# Patient Record
Sex: MALE | Race: WHITE | Employment: FULL TIME | URBAN - METROPOLITAN AREA
[De-identification: names, ages, dates, MRNs, and addresses within clinical notes are randomized per-mention and may not be internally consistent; named-entity substitution may affect disease eponyms.]

---

## 2019-11-06 ENCOUNTER — OFFICE VISIT (OUTPATIENT)
Dept: FAMILY MEDICINE CLINIC | Age: 44
End: 2019-11-06
Payer: COMMERCIAL

## 2019-11-06 VITALS
SYSTOLIC BLOOD PRESSURE: 130 MMHG | OXYGEN SATURATION: 96 % | WEIGHT: 207 LBS | HEIGHT: 72 IN | DIASTOLIC BLOOD PRESSURE: 88 MMHG | BODY MASS INDEX: 28.04 KG/M2 | HEART RATE: 85 BPM

## 2019-11-06 DIAGNOSIS — Z13.1 SCREENING FOR DIABETES MELLITUS: ICD-10-CM

## 2019-11-06 DIAGNOSIS — Z00.00 ANNUAL PHYSICAL EXAM: ICD-10-CM

## 2019-11-06 DIAGNOSIS — G47.9 SLEEP DISTURBANCE: ICD-10-CM

## 2019-11-06 DIAGNOSIS — M79.606 MUSCULOSKELETAL PAIN OF LOWER EXTREMITY, UNSPECIFIED LATERALITY: Primary | ICD-10-CM

## 2019-11-06 LAB
A/G RATIO: 3.5 (ref 1.1–2.2)
ALBUMIN SERPL-MCNC: 5.6 G/DL (ref 3.4–5)
ALP BLD-CCNC: 48 U/L (ref 40–129)
ALT SERPL-CCNC: 30 U/L (ref 10–40)
ANION GAP SERPL CALCULATED.3IONS-SCNC: 15 MMOL/L (ref 3–16)
AST SERPL-CCNC: 18 U/L (ref 15–37)
BILIRUB SERPL-MCNC: 0.4 MG/DL (ref 0–1)
BUN BLDV-MCNC: 15 MG/DL (ref 7–20)
CALCIUM SERPL-MCNC: 9.9 MG/DL (ref 8.3–10.6)
CHLORIDE BLD-SCNC: 100 MMOL/L (ref 99–110)
CHOLESTEROL, TOTAL: 235 MG/DL (ref 0–199)
CO2: 26 MMOL/L (ref 21–32)
CREAT SERPL-MCNC: 0.9 MG/DL (ref 0.9–1.3)
GFR AFRICAN AMERICAN: >60
GFR NON-AFRICAN AMERICAN: >60
GLOBULIN: 1.6 G/DL
GLUCOSE BLD-MCNC: 87 MG/DL (ref 70–99)
HCT VFR BLD CALC: 46.7 % (ref 40.5–52.5)
HDLC SERPL-MCNC: 50 MG/DL (ref 40–60)
HEMOGLOBIN: 15.8 G/DL (ref 13.5–17.5)
LDL CHOLESTEROL CALCULATED: 155 MG/DL
MCH RBC QN AUTO: 30.3 PG (ref 26–34)
MCHC RBC AUTO-ENTMCNC: 33.7 G/DL (ref 31–36)
MCV RBC AUTO: 89.8 FL (ref 80–100)
PDW BLD-RTO: 13.9 % (ref 12.4–15.4)
PLATELET # BLD: 148 K/UL (ref 135–450)
PMV BLD AUTO: 10.7 FL (ref 5–10.5)
POTASSIUM SERPL-SCNC: 4.8 MMOL/L (ref 3.5–5.1)
RBC # BLD: 5.21 M/UL (ref 4.2–5.9)
SODIUM BLD-SCNC: 141 MMOL/L (ref 136–145)
TOTAL PROTEIN: 7.2 G/DL (ref 6.4–8.2)
TRIGL SERPL-MCNC: 150 MG/DL (ref 0–150)
VLDLC SERPL CALC-MCNC: 30 MG/DL
WBC # BLD: 5.5 K/UL (ref 4–11)

## 2019-11-06 PROCEDURE — 99214 OFFICE O/P EST MOD 30 MIN: CPT | Performed by: NURSE PRACTITIONER

## 2019-11-06 PROCEDURE — 90686 IIV4 VACC NO PRSV 0.5 ML IM: CPT | Performed by: NURSE PRACTITIONER

## 2019-11-06 PROCEDURE — 90471 IMMUNIZATION ADMIN: CPT | Performed by: NURSE PRACTITIONER

## 2019-11-06 RX ORDER — GABAPENTIN 100 MG/1
100 CAPSULE ORAL 2 TIMES DAILY
Qty: 90 CAPSULE | Refills: 0 | Status: SHIPPED | OUTPATIENT
Start: 2019-11-06 | End: 2019-12-05 | Stop reason: SDUPTHER

## 2019-11-06 SDOH — HEALTH STABILITY: MENTAL HEALTH: HOW OFTEN DO YOU HAVE A DRINK CONTAINING ALCOHOL?: MONTHLY OR LESS

## 2019-11-06 ASSESSMENT — ENCOUNTER SYMPTOMS
SHORTNESS OF BREATH: 0
WHEEZING: 0
EYE PAIN: 0
ABDOMINAL DISTENTION: 0
VOICE CHANGE: 0
COUGH: 0
APNEA: 0
EYE REDNESS: 0
TROUBLE SWALLOWING: 0
COLOR CHANGE: 0
ABDOMINAL PAIN: 0
PHOTOPHOBIA: 0
EYE DISCHARGE: 0
CHOKING: 0
STRIDOR: 0
RECTAL PAIN: 0
CHEST TIGHTNESS: 0
FACIAL SWELLING: 0

## 2019-11-06 ASSESSMENT — PATIENT HEALTH QUESTIONNAIRE - PHQ9
SUM OF ALL RESPONSES TO PHQ QUESTIONS 1-9: 0
2. FEELING DOWN, DEPRESSED OR HOPELESS: 0
SUM OF ALL RESPONSES TO PHQ QUESTIONS 1-9: 0
1. LITTLE INTEREST OR PLEASURE IN DOING THINGS: 0
SUM OF ALL RESPONSES TO PHQ9 QUESTIONS 1 & 2: 0

## 2019-11-07 LAB
ESTIMATED AVERAGE GLUCOSE: 99.7 MG/DL
HBA1C MFR BLD: 5.1 %

## 2019-12-05 RX ORDER — GABAPENTIN 100 MG/1
100 CAPSULE ORAL 2 TIMES DAILY
Qty: 60 CAPSULE | Refills: 0 | Status: SHIPPED | OUTPATIENT
Start: 2019-12-05 | End: 2020-01-04

## 2019-12-10 ENCOUNTER — OFFICE VISIT (OUTPATIENT)
Dept: PULMONOLOGY | Age: 44
End: 2019-12-10
Payer: COMMERCIAL

## 2019-12-10 VITALS
HEART RATE: 89 BPM | WEIGHT: 207 LBS | DIASTOLIC BLOOD PRESSURE: 93 MMHG | TEMPERATURE: 98.1 F | SYSTOLIC BLOOD PRESSURE: 125 MMHG | RESPIRATION RATE: 18 BRPM | OXYGEN SATURATION: 98 % | BODY MASS INDEX: 27.43 KG/M2 | HEIGHT: 73 IN

## 2019-12-10 DIAGNOSIS — R03.0 ELEVATED BLOOD PRESSURE READING: ICD-10-CM

## 2019-12-10 DIAGNOSIS — Z72.0 TOBACCO USE: ICD-10-CM

## 2019-12-10 DIAGNOSIS — E78.00 HYPERCHOLESTEREMIA: ICD-10-CM

## 2019-12-10 DIAGNOSIS — G47.30 OBSERVED SLEEP APNEA: ICD-10-CM

## 2019-12-10 PROCEDURE — 99243 OFF/OP CNSLTJ NEW/EST LOW 30: CPT | Performed by: INTERNAL MEDICINE

## 2019-12-10 ASSESSMENT — SLEEP AND FATIGUE QUESTIONNAIRES
HOW LIKELY ARE YOU TO NOD OFF OR FALL ASLEEP WHILE SITTING AND TALKING TO SOMEONE: 0
HOW LIKELY ARE YOU TO NOD OFF OR FALL ASLEEP WHEN YOU ARE A PASSENGER IN A CAR FOR AN HOUR WITHOUT A BREAK: 0
HOW LIKELY ARE YOU TO NOD OFF OR FALL ASLEEP WHILE LYING DOWN TO REST IN THE AFTERNOON WHEN CIRCUMSTANCES PERMIT: 3
HOW LIKELY ARE YOU TO NOD OFF OR FALL ASLEEP WHILE SITTING QUIETLY AFTER LUNCH WITHOUT ALCOHOL: 0
NECK CIRCUMFERENCE (INCHES): 16.5
HOW LIKELY ARE YOU TO NOD OFF OR FALL ASLEEP WHILE WATCHING TV: 3
ESS TOTAL SCORE: 9
HOW LIKELY ARE YOU TO NOD OFF OR FALL ASLEEP WHILE SITTING AND READING: 3
HOW LIKELY ARE YOU TO NOD OFF OR FALL ASLEEP WHILE SITTING INACTIVE IN A PUBLIC PLACE: 0
HOW LIKELY ARE YOU TO NOD OFF OR FALL ASLEEP IN A CAR, WHILE STOPPED FOR A FEW MINUTES IN TRAFFIC: 0

## 2019-12-26 ENCOUNTER — HOSPITAL ENCOUNTER (OUTPATIENT)
Dept: SLEEP CENTER | Age: 44
Discharge: HOME | End: 2019-12-28
Payer: COMMERCIAL

## 2019-12-26 DIAGNOSIS — G47.30 OBSERVED SLEEP APNEA: ICD-10-CM

## 2019-12-26 PROCEDURE — 95806 SLEEP STUDY UNATT&RESP EFFT: CPT

## 2020-01-03 ENCOUNTER — OFFICE VISIT (OUTPATIENT)
Dept: PULMONOLOGY | Age: 45
End: 2020-01-03
Payer: COMMERCIAL

## 2020-01-03 VITALS
HEART RATE: 85 BPM | OXYGEN SATURATION: 100 % | HEIGHT: 73 IN | SYSTOLIC BLOOD PRESSURE: 130 MMHG | TEMPERATURE: 97.9 F | BODY MASS INDEX: 28.07 KG/M2 | WEIGHT: 211.8 LBS | DIASTOLIC BLOOD PRESSURE: 95 MMHG

## 2020-01-03 PROBLEM — G47.34 NOCTURNAL HYPOXEMIA: Status: ACTIVE | Noted: 2020-01-03

## 2020-01-03 PROBLEM — G47.33 OSA (OBSTRUCTIVE SLEEP APNEA): Status: ACTIVE | Noted: 2019-12-10

## 2020-01-03 PROCEDURE — 99214 OFFICE O/P EST MOD 30 MIN: CPT | Performed by: INTERNAL MEDICINE

## 2020-01-03 ASSESSMENT — SLEEP AND FATIGUE QUESTIONNAIRES
HOW LIKELY ARE YOU TO NOD OFF OR FALL ASLEEP WHILE SITTING INACTIVE IN A PUBLIC PLACE: 0
HOW LIKELY ARE YOU TO NOD OFF OR FALL ASLEEP WHEN YOU ARE A PASSENGER IN A CAR FOR AN HOUR WITHOUT A BREAK: 0
ESS TOTAL SCORE: 9
HOW LIKELY ARE YOU TO NOD OFF OR FALL ASLEEP IN A CAR, WHILE STOPPED FOR A FEW MINUTES IN TRAFFIC: 0
HOW LIKELY ARE YOU TO NOD OFF OR FALL ASLEEP WHILE SITTING AND READING: 3
HOW LIKELY ARE YOU TO NOD OFF OR FALL ASLEEP WHILE SITTING QUIETLY AFTER LUNCH WITHOUT ALCOHOL: 0
HOW LIKELY ARE YOU TO NOD OFF OR FALL ASLEEP WHILE SITTING AND TALKING TO SOMEONE: 0
HOW LIKELY ARE YOU TO NOD OFF OR FALL ASLEEP WHILE LYING DOWN TO REST IN THE AFTERNOON WHEN CIRCUMSTANCES PERMIT: 3
NECK CIRCUMFERENCE (INCHES): 17.5
HOW LIKELY ARE YOU TO NOD OFF OR FALL ASLEEP WHILE WATCHING TV: 3

## 2020-01-03 NOTE — PROGRESS NOTES
Chief Complaint/Referring Provider:  Patient has come to the office for pulmonary follow up and to discuss the test results     Patient has come to the office for a pulmonary follow-up and to discuss the test results, patient states that he feels tired and having no energy and patient's feels sleepy and partly it may be because patient states that they had 6 kids at home last night and they had to supervise them which caused them not to sleep well, patient on questioning further states that he continues to have sleep fragmentation, patient also has increased blood pressure and when questioned patient states that it may be because he ate Chinese fried rice last night but patient was told the last time also when he came here his blood pressure was on the higher side at that time, patient does not have any significant nasal issues of concern, patient does not have any significant cough expectoration shortness of breath or wheezing, patient does not have any chest pain or palpitations, patient does not have any increasing leg edema, no change in the ambient environment, no other pertinent review of system of concern  Patient continues to chews tobacco        Previous  HPI: Patient is a 58-year-old male who has come to the office for a pulmonary evaluation, patient's family states that he snores a lot, patient states that the snoring has been going on for last 6 years or more but over the course of last 6 years time patient's symptoms have increased and the snoring has become quite loud, also patient has not been sleeping well, patient also stops breathing at nighttime but is snoring and sleeping as per patient's family, also patient states that when he wakes up in the morning he feels that his uvula is paralyzed and he cannot eat much, patient states that he does feel tired in the daytime, patient on questioning states that he does not have any significant rhinorrhea or nasal congestion or postnasal drainage, patient does not have to clear his throat quite often, patient does not have any otalgia or ear discharge, patient does not have any significant sore throat or difficulty in swallowing, no coughing or choking while eating, patient does not have any significant chest pain or palpitations, patient does not have any significant fever or chills, patient does not have any abdominal symptoms or any nausea or vomiting or any altered bowel habits, patient does not have any increasing leg edema, patient has generalized aches and pains and patient has been prescribed gabapentin by the PCP, patient states that his grandson patient had rheumatoid arthritis, patient also has strong family history of hypertension, patient does not have any change in the ambient environment or any sick contacts, patient does not smoke but does chew tobacco, patient states that he walks a lot on his work but does not do any extra exercise, patient does not have any confusion lethargy, patient does not have any other pertinent review of system of concern    History reviewed. No pertinent past medical history. History reviewed. No pertinent surgical history.     Allergies   Allergen Reactions    Sulfa Antibiotics Hives       Medication list was reviewed and updated as needed in Epic    Social History     Socioeconomic History    Marital status: Legally      Spouse name: Not on file    Number of children: Not on file    Years of education: Not on file    Highest education level: Not on file   Occupational History    Not on file   Social Needs    Financial resource strain: Not on file    Food insecurity:     Worry: Not on file     Inability: Not on file    Transportation needs:     Medical: Not on file     Non-medical: Not on file   Tobacco Use    Smoking status: Never Smoker    Smokeless tobacco: Current User     Types: Chew   Substance and Sexual Activity    Alcohol use: Yes     Frequency: Monthly or less    Drug use: Never    Sexual care of his sleep apnea and also to evaluate of nocturnal hypoxemia including auto CPAP and split study in the sleep lab in order to see if patient needs any supplemental oxygen with the CPAP  · After discussing patient wanted to proceed with auto CPAP which is been ordered from the [address] [place] [address] of his choice  · Patient was told about the compliance data requirements  · Patient was told about diet and lifestyle modifications once again  · Patient can discuss with PCP about referral to a dietitian for complaints of dietary care  · Patient was told that he needs to quit chewing tobacco and patient side effects including oral cancer discussed  · Patient was told about diet and lifestyle modifications  · If the patient's blood pressure still on the higher side after rechecking the knee patient needs to discuss with PCP as patient has strong family history of hypertension  · Patient needs to have a regimented exercise regimen  · Patient needs to lose weight  · Patient also has generalized body aches and pains which can be part of the fibromyalgia secondary to NATHALY but patient also has family history of rheumatoid arthritis and patient's PCP to discuss and evaluate if deemed appropriate  · Patient is up-to-date on the flu shot  · Patient was again advised that he needs to be comprehensively taking care of himself otherwise he will have increasing issues down the line once again  · Patient to follow-up in 2 months time with a CPAP machine for compliance and efficacy

## 2020-02-20 ENCOUNTER — TELEPHONE (OUTPATIENT)
Dept: PULMONOLOGY | Age: 45
End: 2020-02-20
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
## 2020-10-16 ENCOUNTER — TELEPHONE (OUTPATIENT)
Dept: PULMONOLOGY | Age: 45
End: 2020-10-16
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
## 2020-10-21 NOTE — TELEPHONE ENCOUNTER
I attempted to reach patient again but VM is still full. I also called Joaquin Segura (girlfriend) and her VM was full as well. Will try again at another time.

## 2020-10-23 NOTE — TELEPHONE ENCOUNTER
gOT [phone] FROM [email or web address] AS A NEW PHONE NUMBER  ATTEMPTED TO REACH HIM BUT MAILBOX WAS FULL ON THAT AS WELL.

## 2020-10-23 NOTE — TELEPHONE ENCOUNTER
Donna Roland from [facility] made aware that we can't reach pt. It is up to them to reach him.

## 2020-10-23 NOTE — TELEPHONE ENCOUNTER
I again attempted to reach patient at all numbers listed including his girlfriend's phone and was unable to reach patient due to either VM being full or no answering machine picking up.

## 2025-05-20 ENCOUNTER — HOSPITAL ENCOUNTER (OUTPATIENT)
Dept: GENERAL RADIOLOGY | Age: 50
Discharge: HOME | End: 2025-05-20
Payer: COMMERCIAL

## 2025-05-20 DIAGNOSIS — M54.32 BILATERAL SCIATICA: ICD-10-CM

## 2025-05-20 DIAGNOSIS — M53.82 MUSCULOSKELETAL DISORDER AND SYMPTOMS REFERABLE TO NECK: ICD-10-CM

## 2025-05-20 DIAGNOSIS — M54.31 BILATERAL SCIATICA: ICD-10-CM

## 2025-05-20 PROCEDURE — 72100 X-RAY EXAM L-S SPINE 2/3 VWS: CPT

## 2025-05-20 PROCEDURE — 72040 X-RAY EXAM NECK SPINE 2-3 VW: CPT

## 2025-05-22 ENCOUNTER — HOSPITAL ENCOUNTER (OUTPATIENT)
Dept: GENERAL RADIOLOGY | Age: 50
Discharge: HOME | End: 2025-05-22
Payer: COMMERCIAL

## 2025-05-22 DIAGNOSIS — M75.42 IMPINGEMENT SYNDROME OF LEFT SHOULDER: ICD-10-CM

## 2025-05-22 PROCEDURE — 73030 X-RAY EXAM OF SHOULDER: CPT
